# Patient Record
Sex: FEMALE | Race: WHITE | NOT HISPANIC OR LATINO | Employment: FULL TIME | URBAN - METROPOLITAN AREA
[De-identification: names, ages, dates, MRNs, and addresses within clinical notes are randomized per-mention and may not be internally consistent; named-entity substitution may affect disease eponyms.]

---

## 2017-01-12 ENCOUNTER — HOSPITAL ENCOUNTER (OUTPATIENT)
Dept: RADIOLOGY | Facility: HOSPITAL | Age: 52
Discharge: HOME | End: 2017-01-12
Attending: FAMILY MEDICINE
Payer: COMMERCIAL

## 2017-01-12 DIAGNOSIS — J30.9 ALLERGIC RHINITIS, UNSPECIFIED ALLERGIC RHINITIS TRIGGER, UNSPECIFIED RHINITIS SEASONALITY: ICD-10-CM

## 2017-01-12 DIAGNOSIS — R42 DIZZINESS: ICD-10-CM

## 2017-01-12 DIAGNOSIS — R42 VERTIGO: ICD-10-CM

## 2017-01-12 PROCEDURE — 70551 MRI BRAIN STEM W/O DYE: CPT | Mod: TC

## 2017-01-12 PROCEDURE — 70551 MRI BRAIN STEM W/O DYE: CPT | Mod: 26,,, | Performed by: RADIOLOGY

## 2021-04-12 ENCOUNTER — OFFICE VISIT (OUTPATIENT)
Dept: OTOLARYNGOLOGY | Facility: CLINIC | Age: 56
End: 2021-04-12
Payer: COMMERCIAL

## 2021-04-12 ENCOUNTER — CLINICAL SUPPORT (OUTPATIENT)
Dept: AUDIOLOGY | Facility: CLINIC | Age: 56
End: 2021-04-12
Payer: COMMERCIAL

## 2021-04-12 VITALS — WEIGHT: 175.25 LBS | BODY MASS INDEX: 31.05 KG/M2

## 2021-04-12 DIAGNOSIS — H90.3 SENSORINEURAL HEARING LOSS (SNHL) OF BOTH EARS: Primary | ICD-10-CM

## 2021-04-12 DIAGNOSIS — R42 DIZZINESS: ICD-10-CM

## 2021-04-12 DIAGNOSIS — H93.13 TINNITUS OF BOTH EARS: ICD-10-CM

## 2021-04-12 DIAGNOSIS — H90.3 SENSORINEURAL HEARING LOSS (SNHL), BILATERAL: Primary | ICD-10-CM

## 2021-04-12 PROCEDURE — 92567 TYMPANOMETRY: CPT | Mod: S$GLB,,, | Performed by: AUDIOLOGIST

## 2021-04-12 PROCEDURE — 3008F BODY MASS INDEX DOCD: CPT | Mod: CPTII,S$GLB,, | Performed by: OTOLARYNGOLOGY

## 2021-04-12 PROCEDURE — 92567 PR TYMPA2METRY: ICD-10-PCS | Mod: S$GLB,,, | Performed by: AUDIOLOGIST

## 2021-04-12 PROCEDURE — 1126F PR PAIN SEVERITY QUANTIFIED, NO PAIN PRESENT: ICD-10-PCS | Mod: S$GLB,,, | Performed by: OTOLARYNGOLOGY

## 2021-04-12 PROCEDURE — 99203 OFFICE O/P NEW LOW 30 MIN: CPT | Mod: S$GLB,,, | Performed by: OTOLARYNGOLOGY

## 2021-04-12 PROCEDURE — 99203 PR OFFICE/OUTPT VISIT, NEW, LEVL III, 30-44 MIN: ICD-10-PCS | Mod: S$GLB,,, | Performed by: OTOLARYNGOLOGY

## 2021-04-12 PROCEDURE — 99999 PR PBB SHADOW E&M-NEW PATIENT-LVL IV: ICD-10-PCS | Mod: PBBFAC,,, | Performed by: OTOLARYNGOLOGY

## 2021-04-12 PROCEDURE — 3008F PR BODY MASS INDEX (BMI) DOCUMENTED: ICD-10-PCS | Mod: CPTII,S$GLB,, | Performed by: OTOLARYNGOLOGY

## 2021-04-12 PROCEDURE — 1126F AMNT PAIN NOTED NONE PRSNT: CPT | Mod: S$GLB,,, | Performed by: OTOLARYNGOLOGY

## 2021-04-12 PROCEDURE — 92557 PR COMPREHENSIVE HEARING TEST: ICD-10-PCS | Mod: S$GLB,,, | Performed by: AUDIOLOGIST

## 2021-04-12 PROCEDURE — 92557 COMPREHENSIVE HEARING TEST: CPT | Mod: S$GLB,,, | Performed by: AUDIOLOGIST

## 2021-04-12 PROCEDURE — 99999 PR PBB SHADOW E&M-NEW PATIENT-LVL IV: CPT | Mod: PBBFAC,,, | Performed by: OTOLARYNGOLOGY

## 2021-04-15 ENCOUNTER — PATIENT MESSAGE (OUTPATIENT)
Dept: RESEARCH | Facility: HOSPITAL | Age: 56
End: 2021-04-15

## 2021-11-11 ENCOUNTER — CLINICAL SUPPORT (OUTPATIENT)
Dept: AUDIOLOGY | Facility: CLINIC | Age: 56
End: 2021-11-11
Payer: COMMERCIAL

## 2021-11-11 DIAGNOSIS — H90.3 SENSORINEURAL HEARING LOSS, BILATERAL: Primary | ICD-10-CM

## 2021-11-11 PROCEDURE — 99499 UNLISTED E&M SERVICE: CPT | Mod: S$GLB,,, | Performed by: AUDIOLOGIST

## 2021-11-11 PROCEDURE — 99499 NO LOS: ICD-10-PCS | Mod: S$GLB,,, | Performed by: AUDIOLOGIST

## 2021-11-29 ENCOUNTER — CLINICAL SUPPORT (OUTPATIENT)
Dept: AUDIOLOGY | Facility: CLINIC | Age: 56
End: 2021-11-29
Payer: COMMERCIAL

## 2021-11-29 DIAGNOSIS — H90.3 SENSORINEURAL HEARING LOSS, BILATERAL: Primary | ICD-10-CM

## 2021-11-29 PROCEDURE — JEFTAX-P JEFTAX-P: PRESCRIBED RATE 3.5%: ICD-10-PCS | Mod: CSM,,, | Performed by: AUDIOLOGIST

## 2021-11-29 PROCEDURE — V5261 PR HEARING AID, DIGIT, BIN, BTE: ICD-10-PCS | Mod: CSM,S$GLB,, | Performed by: AUDIOLOGIST

## 2021-11-29 PROCEDURE — V5261 HEARING AID, DIGIT, BIN, BTE: HCPCS | Mod: CSM,S$GLB,, | Performed by: AUDIOLOGIST

## 2021-11-29 PROCEDURE — JEFTAX-P JEFTAX-P: PRESCRIBED RATE 3.5%: Mod: CSM,,, | Performed by: AUDIOLOGIST

## 2021-11-29 PROCEDURE — 99499 UNLISTED E&M SERVICE: CPT | Mod: ,,, | Performed by: AUDIOLOGIST

## 2021-11-29 PROCEDURE — 99499 NO LOS: ICD-10-PCS | Mod: ,,, | Performed by: AUDIOLOGIST

## 2021-12-15 ENCOUNTER — CLINICAL SUPPORT (OUTPATIENT)
Dept: AUDIOLOGY | Facility: CLINIC | Age: 56
End: 2021-12-15
Payer: COMMERCIAL

## 2021-12-15 DIAGNOSIS — H90.3 SENSORINEURAL HEARING LOSS, BILATERAL: Primary | ICD-10-CM

## 2021-12-15 PROCEDURE — 99499 NO LOS: ICD-10-PCS | Mod: S$GLB,,, | Performed by: AUDIOLOGIST

## 2021-12-15 PROCEDURE — 99499 UNLISTED E&M SERVICE: CPT | Mod: S$GLB,,, | Performed by: AUDIOLOGIST

## 2022-05-27 ENCOUNTER — PATIENT MESSAGE (OUTPATIENT)
Dept: AUDIOLOGY | Facility: CLINIC | Age: 57
End: 2022-05-27
Payer: COMMERCIAL

## 2022-06-20 ENCOUNTER — DOCUMENTATION ONLY (OUTPATIENT)
Dept: AUDIOLOGY | Facility: CLINIC | Age: 57
End: 2022-06-20
Payer: COMMERCIAL

## 2022-06-20 NOTE — PROGRESS NOTES
Patient had right hearing aid  replaced      [employer] P90-RT   Champagne   Purchase Date: 11/11/2021  Lt SN [id number]   Rt SN [id number]   : M0   Tristine: Small vented  Warranty Exp 2/8/25

## 2022-12-06 ENCOUNTER — OFFICE VISIT (OUTPATIENT)
Dept: URGENT CARE | Facility: CLINIC | Age: 57
End: 2022-12-06
Payer: COMMERCIAL

## 2022-12-06 PROCEDURE — 3044F HG A1C LEVEL LT 7.0%: CPT | Mod: CPTII,S$GLB,, | Performed by: EMERGENCY MEDICINE

## 2022-12-06 PROCEDURE — 3044F PR MOST RECENT HEMOGLOBIN A1C LEVEL <7.0%: ICD-10-PCS | Mod: CPTII,S$GLB,, | Performed by: EMERGENCY MEDICINE

## 2024-06-06 ENCOUNTER — OFFICE VISIT (OUTPATIENT)
Dept: OPTOMETRY | Facility: CLINIC | Age: 59
End: 2024-06-06
Payer: COMMERCIAL

## 2024-06-06 DIAGNOSIS — H25.13 NS (NUCLEAR SCLEROSIS), BILATERAL: ICD-10-CM

## 2024-06-06 DIAGNOSIS — H52.4 PRESBYOPIA: ICD-10-CM

## 2024-06-06 DIAGNOSIS — H02.889 MEIBOMIAN GLAND DISEASE, UNSPECIFIED LATERALITY: Primary | ICD-10-CM

## 2024-06-06 DIAGNOSIS — H04.123 DRY EYE SYNDROME, BILATERAL: ICD-10-CM

## 2024-06-06 PROCEDURE — 1159F MED LIST DOCD IN RCRD: CPT | Mod: CPTII,S$GLB,, | Performed by: OPTOMETRIST

## 2024-06-06 PROCEDURE — 99999 PR PBB SHADOW E&M-EST. PATIENT-LVL IV: CPT | Mod: PBBFAC,,, | Performed by: OPTOMETRIST

## 2024-06-06 PROCEDURE — 1160F RVW MEDS BY RX/DR IN RCRD: CPT | Mod: CPTII,S$GLB,, | Performed by: OPTOMETRIST

## 2024-06-06 PROCEDURE — 92004 COMPRE OPH EXAM NEW PT 1/>: CPT | Mod: S$GLB,,, | Performed by: OPTOMETRIST

## 2024-06-06 RX ORDER — DOXYCYCLINE 100 MG/1
100 CAPSULE ORAL EVERY 12 HOURS
Qty: 60 CAPSULE | Refills: 0 | Status: SHIPPED | OUTPATIENT
Start: 2024-06-06 | End: 2024-07-06

## 2024-06-06 NOTE — PROGRESS NOTES
Dictation #1  MRN:[id number]  CSN:[id number] HPI    Urgent     Pt states that for the last week there has been a bump on RUL and the   vision has been blurry. Pt denies dryness, burning, itching, or tearing.   Pt states that last week the eye was swollen shut, and pain on the lid and   socket. Pt denies any pain today, and complains of pressure and eye   strain.    GTTS:   Warm compresses Qhs   AT's QD  Last edited by Brigette Manzo MA on 6/6/2024  1:06 PM.            Assessment /Plan     For exam results, see Encounter Report.    Meibomian gland disease, unspecified laterality  -Start     doxycycline (VIBRAMYCIN) 100 MG Cap; Take 1 capsule (100 mg total) by mouth every 12 (twelve) hours. X 2 weeks, then daily until finished   Start IVIZIA eyelid scrubs and HYPOCHLOR spray BID OU   Use PF art tears QID OU    NS (nuclear sclerosis), bilateral   MILD, montior    Presbyopia   Current specs OK   Blurred vision OD likely 2/2 dry eye and MGD   Return if no improvement in vision      RTC 1 year

## 2024-07-10 ENCOUNTER — OFFICE VISIT (OUTPATIENT)
Dept: URGENT CARE | Facility: CLINIC | Age: 59
End: 2024-07-10
Payer: COMMERCIAL

## 2024-07-10 ENCOUNTER — HOSPITAL ENCOUNTER (OUTPATIENT)
Dept: CARDIOLOGY | Facility: HOSPITAL | Age: 59
Discharge: HOME | End: 2024-07-10
Payer: COMMERCIAL

## 2024-07-10 VITALS
RESPIRATION RATE: 20 BRPM | BODY MASS INDEX: 29.38 KG/M2 | SYSTOLIC BLOOD PRESSURE: 129 MMHG | DIASTOLIC BLOOD PRESSURE: 78 MMHG | HEART RATE: 88 BPM | HEIGHT: 63 IN | WEIGHT: 165.81 LBS | TEMPERATURE: 98 F

## 2024-07-10 DIAGNOSIS — M62.831 MUSCLE SPASM OF RIGHT CALF: Primary | ICD-10-CM

## 2024-07-10 DIAGNOSIS — M79.661 RIGHT CALF PAIN: ICD-10-CM

## 2024-07-10 PROCEDURE — 93971 EXTREMITY STUDY: CPT | Mod: RT

## 2024-07-10 PROCEDURE — 99213 OFFICE O/P EST LOW 20 MIN: CPT | Mod: S$GLB,,,

## 2024-07-10 PROCEDURE — 93971 EXTREMITY STUDY: CPT | Mod: 26,RT,, | Performed by: INTERNAL MEDICINE

## 2024-07-10 RX ORDER — PRASTERONE 6.5 MG/1
6.5 INSERT VAGINAL
COMMUNITY
Start: 2024-07-01 | End: 2025-07-01

## 2024-07-10 RX ORDER — FEZOLINETANT 45 MG/1
45 TABLET, FILM COATED ORAL
COMMUNITY
Start: 2024-07-01 | End: 2024-09-29

## 2024-07-10 RX ORDER — TIZANIDINE HYDROCHLORIDE 2 MG/1
CAPSULE, GELATIN COATED ORAL
COMMUNITY
Start: 2024-02-29

## 2024-07-10 NOTE — PATIENT INSTRUCTIONS
I have ordered an ultrasound of the right leg to evaluate for any potential blood clot. You should receive a call from [facility] within the next 1-3 days to set up an appointment. If you do not receive a call, you may call our Referral Hotline at [phone] to make an appointment.    Treatment  Gentle stretching should help stop a spasm. Often, you can ease the spasm just by stretching the muscle. Stretching exercises keep your muscles flexible. They also stop them from getting too tight. Do stretches slowly and hold each stretch for 20 to 30 seconds. Try to do the stretches you were shown 2 to 3 times each day.  Ice or heat may help ease your pain. Either one may help stop a spasm, but most people find that heat is more helpful.  Soak the sore area in warm water or using a heating pad can help stop the spasm and lower pain. Heat also helps muscles stretch easier. Do not leave a heating pad on more than 20 minutes at a time. Be sure to check your skin while the heating pad is on to avoid burns. Never go to sleep with a heating pad on.  Putting ice on a muscle that is in spasm can help ease the spasm and reduce pain. Use an ice pack or bag of frozen vegetables wrapped in a towel over the painful part. Never put ice right on the skin. Do not leave the ice on more than 10 to 15 minutes at a time. Do not try to stretch the muscle right after icing.  Massaging the cramping muscle with firm pressure may help ease the spasm.  Drinking extra fluids can help muscle spasms if they are caused by a loss of body fluids. Avoid intense exercise in hot and humid weather to lower the chance of getting muscle spasms.  Sometimes, you may get muscle spasms if you dont get enough of certain nutrients in your diet, like potassium, magnesium, or carbohydrates. If this is the case, changing your diet can help you to avoid muscle cramps. Talk to your doctor about what to eat and drink before and after exercise.  You may want to take  medicine like ibuprofen, naproxen, or acetaminophen to help with pain.    What Can Stop a Muscle Spasm?   Stretching ? Gentle stretching should help stop the spasm. Most often, when a muscle is spasming or shortening in one direction, you stretch the muscle in the opposite direction. Stretching exercises keep your muscles flexible. They also stop them from getting tight.  Start by doing each of these stretches 2 to 3 times. In order for your body to make changes, you will need to hold these stretches for 20 to 30 seconds. Try to do the stretches 2 to 3 times each day. Do all exercises slowly.  Calf stretches standing ? Stand about 12 to 18 inches (30 to 45 cm) away from a wall. Place your hands on the wall at shoulder level. Lean forward. Stretch your left leg straight behind you. Make sure the heel is flat on the floor and the knee straight. Now, bend the knee of the right leg. Be sure that the heel does not come up. Bend your left knee forward until you feel a stretch in the back of the calf of your right leg. This will feel strange, but it is the best way to stretch this calf muscle. Repeat on the other side.           Return to clinic or go to the ED if  Calf pain worsens  You develop redness or swelling of the leg  You are unable to walk due to pain  Pain moves to other areas   Your leg becomes numb  You develop chest pain or difficulty breathing  You have any other concerns or complaints    Should you develop any worsening or new symptoms after leaving urgent care, it is recommended that you go to the ER for further/repeat evaluation.      Follow up with your PCP in 3-5 days after your urgent care visit.     Please remember that you have received care at an urgent care today. Urgent cares are not emergency rooms and are not equipped to handle life threatening emergencies and cannot rule in or out certain medical conditions and you may be released before all of your medical problems are known or treated, please  schedule all follow up appointments as discussed and if you have worsening symptoms please go to the ER to rule out potential life threatening problems, as discussed.

## 2024-07-10 NOTE — PROGRESS NOTES
"Subjective:      Patient ID: Adri Tyler is a 58 y.o. female.    Vitals:  height is 5' 3" (1.6 m) and weight is 75.2 kg (165 lb 12.6 oz). Her oral temperature is 98 °F (36.7 °C). Her blood pressure is 129/78 and her pulse is 88. Her respiration is 20.     Chief Complaint: Other Misc (Yesterday at around noon while sitting, I started with a spasm in my upper right calf muscle and it hasn't went away. It's a constant pain. I have tried pickle juice, drinking lots of water, banana, stretching, hot / cold compress, ibuprofen/ Aleve. - Entered by patient)    Pt states she was sitting yesterday and her leg started to cramp . Pt says she thinks she my have a blood clot because she has tried water, pickle juice and stretching all with no relief.      Provider note starts below:  Patient presents to clinic for evaluation of right calf pain.  States she was sitting at the beauty salon yesterday around noon when she suddenly developed right-sided calf pain.  States the pain feels like a muscle spasm or "Charley horse".  Symptoms were bothersome, but patient was able to continue her visit at the [facility].  States she massaged the calf while in the chair during her visit.  Patient then went home and tried increasing fluids, drinking pickle juice, stretching the calf, size in the calf, and using ice/heat.  No improvement of cramping.  Patient also tried Aleve, ibuprofen, and Biofreeze which did not provide any relief.  Patient states the cramping sensation has been constant since onset.  Denies any mitigating or exacerbating factors.  Patient states she was up all night due to symptoms.  Patient denies any leg swelling, color change, difficulty walking, extremity weakness/numbness, tingling, fever, chills, chest pain, palpitations, shortness for breath, bloody sputum, cough, wound.  Patient denies any recent surgery/hospitalizations or long car rides/plane rides.  No recent medication changes.  No history previous VTE.  No " blood thinners.      Constitution: Negative for appetite change, chills and fever.   Neck: Negative for neck pain and neck stiffness.   Cardiovascular:  Negative for chest pain, leg swelling, palpitations and sob on exertion.   Respiratory:  Negative for chest tightness, cough, sputum production, bloody sputum, shortness of breath and wheezing.    Gastrointestinal:  Negative for nausea and vomiting.   Musculoskeletal:  Positive for muscle cramps (R calf). Negative for trauma, joint pain, joint swelling, abnormal ROM of joint and back pain.   Skin:  Negative for pale, rash, wound, abrasion, laceration, erythema and bruising.   Neurological:  Negative for dizziness, light-headedness, passing out, loss of balance, disorientation, altered mental status, numbness, tingling and tremors.   Psychiatric/Behavioral:  Negative for altered mental status, disorientation and confusion.       Objective:     Physical Exam   Constitutional: She is oriented to person, place, and time.  Non-toxic appearance. She does not appear ill. No distress.   HENT:   Head: Normocephalic and atraumatic.   Eyes: Conjunctivae are normal. Extraocular movement intact   Neck: Neck supple.   Cardiovascular: Normal rate, regular rhythm, normal heart sounds and normal pulses.   Pulmonary/Chest: Effort normal and breath sounds normal. No stridor. No respiratory distress. She has no wheezes. She has no rhonchi. She has no rales. She exhibits no tenderness.   Abdominal: Normal appearance.   Musculoskeletal: Normal range of motion.         General: Normal range of motion.      Comments: Tenderness to palpation to right calf muscle. No bony tenderness. No crepitus over right knee or ankle. No edema of BLE. Normal ROM. No obvious deformities. No varicosities. 2+ DP and PT pulses. Sensation intact. Good capillary refill.    Neurological: She is alert, oriented to person, place, and time and at baseline.   Skin: Skin is warm and dry. No erythema   Psychiatric:  Her behavior is normal. Mood normal.   Nursing note and vitals reviewed.    Assessment:     1. Muscle spasm of right calf    2. Right calf pain        Plan:     Muscle spasm of right calf    Right calf pain  -     CV Ultrasound doppler venous DVT leg right; Future      Medical Decision Making:   Differential Diagnosis:   Muscle spasm of calf  DVT  Rhabdo    Urgent Care Management:  Offered toradol injection in clinic, pt declined.  Offered Rx for muscle relaxer, pt declined.  States her pain is not intolerable, just had her concerned.  Ordered US to r/o DVT.  Strict ED precautions discussed.     Additional MDM:     Well's Criteria Score:  -Clinical symptoms of DVT (leg swelling, pain with palpation) = 3.0  -PE is #1 diagnosis OR equally likely =            0.0  -Heart Rate >100 =   0.0  -Immobilization (= or > than 3 days) or surgery in the previous 4 weeks = 0.0  -Previous DVT/PE = 0.0  -Hemoptysis =          0.0  -Malignancy =           0.0  Well's Probability Score =    3        Patient Instructions   I have ordered an ultrasound of the right leg to evaluate for any potential blood clot. You should receive a call from [facility] within the next 1-3 days to set up an appointment. If you do not receive a call, you may call our Referral Hotline at [phone] to make an appointment.    Treatment  Gentle stretching should help stop a spasm. Often, you can ease the spasm just by stretching the muscle. Stretching exercises keep your muscles flexible. They also stop them from getting too tight. Do stretches slowly and hold each stretch for 20 to 30 seconds. Try to do the stretches you were shown 2 to 3 times each day.  Ice or heat may help ease your pain. Either one may help stop a spasm, but most people find that heat is more helpful.  Soak the sore area in warm water or using a heating pad can help stop the spasm and lower pain. Heat also helps muscles stretch easier. Do not leave a heating pad on more than 20 minutes  at a time. Be sure to check your skin while the heating pad is on to avoid burns. Never go to sleep with a heating pad on.  Putting ice on a muscle that is in spasm can help ease the spasm and reduce pain. Use an ice pack or bag of frozen vegetables wrapped in a towel over the painful part. Never put ice right on the skin. Do not leave the ice on more than 10 to 15 minutes at a time. Do not try to stretch the muscle right after icing.  Massaging the cramping muscle with firm pressure may help ease the spasm.  Drinking extra fluids can help muscle spasms if they are caused by a loss of body fluids. Avoid intense exercise in hot and humid weather to lower the chance of getting muscle spasms.  Sometimes, you may get muscle spasms if you dont get enough of certain nutrients in your diet, like potassium, magnesium, or carbohydrates. If this is the case, changing your diet can help you to avoid muscle cramps. Talk to your doctor about what to eat and drink before and after exercise.  You may want to take medicine like ibuprofen, naproxen, or acetaminophen to help with pain.    What Can Stop a Muscle Spasm?   Stretching ? Gentle stretching should help stop the spasm. Most often, when a muscle is spasming or shortening in one direction, you stretch the muscle in the opposite direction. Stretching exercises keep your muscles flexible. They also stop them from getting tight.  Start by doing each of these stretches 2 to 3 times. In order for your body to make changes, you will need to hold these stretches for 20 to 30 seconds. Try to do the stretches 2 to 3 times each day. Do all exercises slowly.  Calf stretches standing ? Stand about 12 to 18 inches (30 to 45 cm) away from a wall. Place your hands on the wall at shoulder level. Lean forward. Stretch your left leg straight behind you. Make sure the heel is flat on the floor and the knee straight. Now, bend the knee of the right leg. Be sure that the heel does not come up.  Bend your left knee forward until you feel a stretch in the back of the calf of your right leg. This will feel strange, but it is the best way to stretch this calf muscle. Repeat on the other side.           Return to clinic or go to the ED if  Calf pain worsens  You develop redness or swelling of the leg  You are unable to walk due to pain  Pain moves to other areas   Your leg becomes numb  You develop chest pain or difficulty breathing  You have any other concerns or complaints    Should you develop any worsening or new symptoms after leaving urgent care, it is recommended that you go to the ER for further/repeat evaluation.      Follow up with your PCP in 3-5 days after your urgent care visit.     Please remember that you have received care at an urgent care today. Urgent cares are not emergency rooms and are not equipped to handle life threatening emergencies and cannot rule in or out certain medical conditions and you may be released before all of your medical problems are known or treated, please schedule all follow up appointments as discussed and if you have worsening symptoms please go to the ER to rule out potential life threatening problems, as discussed.

## 2025-04-28 NOTE — PROGRESS NOTES
Subjective:       dAri Tyler is a 59 y.o. female who is a new patient who was referred by Dr. Melchor Mantilla  for evaluation of hematuria.      Referred for microhematuria. No recent urine testing available. Reported trace blood on POCT UA in PCP's note 4/24/25. No recent UA micro in chart. No gross hematuria.     She reports R flank pain x 2-3 weeks. Intermittent. She reports h/o nephrolithiasis - passed spontaneously in 12/2024. She also reports going to  maybe a year ago with nephrolithiasis - CT did not show stone. She was treated for UTI at that time.    No family history h/o stones or  malignancy. Nonsmoker.       The following portions of the patient's history were reviewed and updated as appropriate: allergies, current medications, past family history, past medical history, past social history, past surgical history and problem list.    Review of Systems  12 point review of systems completed. Pertinent positive and negatives listed in HPI        Objective:    Vitals: LMP 12/28/2016 (LMP Unknown)     Physical Exam   General: well developed, well nourished in no acute distress  Head: normocephalic, atraumatic  Neck: no obvious enlargement of thyroid  HEENT: EOMI, mucus membranes moist, sclera anicteric, no hearing impairment  Lungs: symmetric expansion, non-labored breathing  Neuro: alert and oriented x 3, no gross deficits  Psych: normal judgment and insight, normal mood/affect and non-anxious  Genitourinary:   deferred      Lab Review   Urine analysis today in clinic shows - 5-10 RBCs    Lab Results   Component Value Date    WBC 5.7 11/10/2023    WBC 6.6 11/14/2019    HGB 13.0 11/10/2023    HGB 14.0 11/14/2019    HCT 41.0 11/10/2023    HCT 43.5 11/14/2019    MCV 95 11/10/2023    MCV 90 11/14/2019     11/10/2023     11/14/2019     Lab Results   Component Value Date    CREATININE 0.87 11/10/2023    BUN 14 11/10/2023         Imaging  Images and reports were personally reviewed by me and  discussed with patient  CT report 2021       Assessment/Plan:      1. Microhematuria    - Discussed etiology and workup of hematuria   - UCx - UA otherwise clear today   - Cytology - not indicated   - CT urogram   - Office cystoscopy - will hold for now.   - Will get UA micro now. Discussed possible cystoscopy.      2. Flank pain    - Suspected h/o stones   - CT uro now (suspected stones and microhematuria)         Follow up in 2 weeks

## 2025-04-29 ENCOUNTER — OFFICE VISIT (OUTPATIENT)
Dept: UROLOGY | Facility: CLINIC | Age: 60
End: 2025-04-29
Payer: COMMERCIAL

## 2025-04-29 DIAGNOSIS — R31.29 MICROHEMATURIA: Primary | ICD-10-CM

## 2025-04-29 DIAGNOSIS — R10.9 FLANK PAIN: ICD-10-CM

## 2025-04-29 LAB
BACTERIA #/AREA URNS AUTO: ABNORMAL /HPF
MICROSCOPIC COMMENT: ABNORMAL
RBC #/AREA URNS AUTO: 2 /HPF (ref 0–4)
SQUAMOUS #/AREA URNS AUTO: 3 /HPF
WBC #/AREA URNS AUTO: 1 /HPF (ref 0–5)

## 2025-04-29 PROCEDURE — 99204 OFFICE O/P NEW MOD 45 MIN: CPT | Mod: S$GLB,,, | Performed by: UROLOGY

## 2025-04-29 PROCEDURE — 1160F RVW MEDS BY RX/DR IN RCRD: CPT | Mod: CPTII,S$GLB,, | Performed by: UROLOGY

## 2025-04-29 PROCEDURE — 81001 URINALYSIS AUTO W/SCOPE: CPT | Performed by: UROLOGY

## 2025-04-29 PROCEDURE — 1159F MED LIST DOCD IN RCRD: CPT | Mod: CPTII,S$GLB,, | Performed by: UROLOGY

## 2025-04-29 PROCEDURE — 99999 PR PBB SHADOW E&M-EST. PATIENT-LVL IV: CPT | Mod: PBBFAC,,, | Performed by: UROLOGY

## 2025-04-30 ENCOUNTER — RESULTS FOLLOW-UP (OUTPATIENT)
Dept: UROLOGY | Facility: CLINIC | Age: 60
End: 2025-04-30

## 2025-04-30 ENCOUNTER — TELEPHONE (OUTPATIENT)
Dept: UROLOGY | Facility: CLINIC | Age: 60
End: 2025-04-30
Payer: COMMERCIAL

## 2025-04-30 DIAGNOSIS — R10.9 FLANK PAIN: ICD-10-CM

## 2025-04-30 DIAGNOSIS — R31.29 MICROHEMATURIA: Primary | ICD-10-CM

## 2025-04-30 NOTE — TELEPHONE ENCOUNTER
----- Message from Med Assistant Davis sent at 4/30/2025  3:31 PM CDT -----  Regarding: FW: Labs for CT    ----- Message -----  From: Agustina Nguyen  Sent: 4/30/2025   3:22 PM CDT  To: [facility] Staff  Subject: Labs for CT                                       Good day,    Please have provider add a Creatinine lab to epic for the CT , thank you

## 2025-05-01 ENCOUNTER — TELEPHONE (OUTPATIENT)
Dept: UROLOGY | Facility: CLINIC | Age: 60
End: 2025-05-01
Payer: COMMERCIAL

## 2025-05-01 DIAGNOSIS — R31.29 MICROHEMATURIA: Primary | ICD-10-CM

## 2025-05-01 DIAGNOSIS — R10.9 FLANK PAIN: ICD-10-CM

## 2025-05-01 NOTE — TELEPHONE ENCOUNTER
----- Message from Med Assistant Davis sent at 4/30/2025 12:15 PM CDT -----  Regarding: FW: BCBS  Pt need external orders.  ----- Message -----  From: Ned Norman  Sent: 4/30/2025  10:29 AM CDT  To: [facility] Staff  Subject: BCBS                                             Type: Patient Call BackWho called:BC[employer]-Maira is the request in detail:Patient requesting MRI orders to be faxed over to [facility] service [address] [place] [address] phone #[phone]/Fax #[phone] the clinic reply by [facility]? NoWould the patient rather a call back or a response via [facility]? Call Mario call back number:[phone]-patientAdditional Information:Thank you.

## 2025-05-07 ENCOUNTER — TELEPHONE (OUTPATIENT)
Dept: UROLOGY | Facility: CLINIC | Age: 60
End: 2025-05-07
Payer: COMMERCIAL

## 2025-05-07 NOTE — TELEPHONE ENCOUNTER
Spoke to pt. Orders faxed over again.   ----- Message from Eric sent at 5/7/2025  8:44 AM CDT -----  Regarding: Self  Type: Patient Call Back What is the request in detail: pt is at diagnostic imaging and needs her orders sent today , called staff no answer Can the clinic reply by [facility]? No Would the patient rather a call back or a response via [facility]? Call [facility] call back number: .[phone] Information:Thank you.

## 2025-05-10 NOTE — PROGRESS NOTES
"  Subjective:       Adri Tyler is a 59 y.o. female who is a new patient who was referred by Dr. Melchor Mantilla  for evaluation of hematuria.      Referred for microhematuria. No recent urine testing available. Reported trace blood on POCT UA in PCP's note 4/24/25. No recent UA micro in chart. No gross hematuria.     She reports R flank pain x 2-3 weeks. Intermittent. She reports h/o nephrolithiasis - passed spontaneously in 12/2024. She also reports going to  maybe a year ago with nephrolithiasis - CT did not show stone. She was treated for UTI at that time.    No family history h/o stones or  malignancy. Nonsmoker.     UA micro - 2 RBCs (+bacteria)    CT uro (DIS) 5/25 - no stones/hydro/mass. Fat stranding of colon near splenic flexure - possible mild diverticulitis. Diffuse BW thickening (underdistended). Delayed imaging did not opacify R mid/distal ureter well. No stones or filling defects identified.       Now with dysuria x 3 days. Continues to have flank pain. She is understandably frustrated by lack of answers to explain her R flank pain based on the CT scan.       The following portions of the patient's history were reviewed and updated as appropriate: allergies, current medications, past family history, past medical history, past social history, past surgical history and problem list.    Review of Systems  12 point review of systems completed. Pertinent positive and negatives listed in HPI        Objective:    Vitals: Ht 5' 3" (1.6 m)   Wt 79.7 kg (175 lb 10.6 oz)   LMP 12/28/2016 (LMP Unknown)   BMI 31.12 kg/m²     Physical Exam   General: well developed, well nourished in no acute distress  Head: normocephalic, atraumatic  Neck: no obvious enlargement of thyroid  HEENT: EOMI, mucus membranes moist, sclera anicteric, no hearing impairment  Lungs: symmetric expansion, non-labored breathing  Neuro: alert and oriented x 3, no gross deficits  Psych: normal judgment and insight, normal mood/affect " and non-anxious  Genitourinary: deferred      Lab Review   Urine analysis today in clinic shows - 5-10 RBCs, small ketones    Lab Results   Component Value Date    WBC 5.7 11/10/2023    WBC 6.6 11/14/2019    HGB 13.0 11/10/2023    HGB 14.0 11/14/2019    HCT 41.0 11/10/2023    HCT 43.5 11/14/2019    MCV 95 11/10/2023    MCV 90 11/14/2019     11/10/2023     11/14/2019     Lab Results   Component Value Date    CREATININE 0.87 11/10/2023    BUN 14 11/10/2023         Imaging  Images and reports were personally reviewed by me and discussed with patient  CT report 2021, CT 2025       Assessment/Plan:      1. Microhematuria    - Discussed etiology and workup of hematuria   - UCx - UA otherwise clear   - Cytology - not indicated   - CT urogram - no stone, possible diverticulitis   - Office cystoscopy - discussed - declined   - UA micro without clinically significant hematuria (2 RBCs)     2. R flank pain    - Suspected h/o stones - no stones on recent CT scan (DIS)   - CT uro - no stones or other  pathology. Possible mild/subacute diverticulitis in L colon.    - Offered cystoscopy to full evaluate bladder - declines.     3. Dysuria   - UCx today    Discussed finding of possible mild/subacute diverticulitis. Recommend f/u with PCP. She states long h/o known diverticulosis.       Follow up PRN

## 2025-05-13 ENCOUNTER — OFFICE VISIT (OUTPATIENT)
Dept: UROLOGY | Facility: CLINIC | Age: 60
End: 2025-05-13
Payer: COMMERCIAL

## 2025-05-13 VITALS — WEIGHT: 175.69 LBS | HEIGHT: 63 IN | BODY MASS INDEX: 31.13 KG/M2

## 2025-05-13 DIAGNOSIS — R10.9 FLANK PAIN: ICD-10-CM

## 2025-05-13 DIAGNOSIS — R30.0 DYSURIA: ICD-10-CM

## 2025-05-13 DIAGNOSIS — R31.29 MICROHEMATURIA: Primary | ICD-10-CM

## 2025-05-13 PROCEDURE — 87186 SC STD MICRODIL/AGAR DIL: CPT | Performed by: UROLOGY

## 2025-05-13 PROCEDURE — 1159F MED LIST DOCD IN RCRD: CPT | Mod: CPTII,S$GLB,, | Performed by: UROLOGY

## 2025-05-13 PROCEDURE — 99999 PR PBB SHADOW E&M-EST. PATIENT-LVL III: CPT | Mod: PBBFAC,,, | Performed by: UROLOGY

## 2025-05-13 PROCEDURE — 1160F RVW MEDS BY RX/DR IN RCRD: CPT | Mod: CPTII,S$GLB,, | Performed by: UROLOGY

## 2025-05-13 PROCEDURE — 3008F BODY MASS INDEX DOCD: CPT | Mod: CPTII,S$GLB,, | Performed by: UROLOGY

## 2025-05-13 PROCEDURE — 99214 OFFICE O/P EST MOD 30 MIN: CPT | Mod: S$GLB,,, | Performed by: UROLOGY

## 2025-05-15 ENCOUNTER — TELEPHONE (OUTPATIENT)
Dept: UROLOGY | Facility: CLINIC | Age: 60
End: 2025-05-15
Payer: COMMERCIAL

## 2025-05-15 ENCOUNTER — RESULTS FOLLOW-UP (OUTPATIENT)
Dept: UROLOGY | Facility: CLINIC | Age: 60
End: 2025-05-15

## 2025-05-15 LAB — BACTERIA UR CULT: ABNORMAL

## 2025-05-15 RX ORDER — CEPHALEXIN 500 MG/1
500 CAPSULE ORAL EVERY 8 HOURS
Qty: 21 CAPSULE | Refills: 0 | Status: SHIPPED | OUTPATIENT
Start: 2025-05-15 | End: 2025-05-22

## 2025-05-15 NOTE — TELEPHONE ENCOUNTER
Attempted to contact pt. Lvm.  ----- Message from Inocencia Duenas MD sent at 5/15/2025  9:55 AM CDT -----  Please inform patient of urinary tract infection on recent urine culture. Appropriate antibiotics (Keflex) were sent in to the pharmacy.    ----- Message -----  From: Lab, Background User  Sent: 5/14/2025   7:26 AM CDT  To: Inocencia Duenas MD

## 2025-05-15 NOTE — TELEPHONE ENCOUNTER
Pt was contacted, informed her of results.  ----- Message from Yves Luis sent at 5/15/2025  3:29 PM CDT -----    ----- Message -----  From: Mari Martinez MA  Sent: 5/15/2025   3:16 PM CDT  To: [facility] Staff    Type:  Patient Returning CallWho Called:Pt Who Left Message for Patient:Ryan Velasco MADoes the patient know what this is regarding?:Would the patient rather a call back or a response via [facility]?  Call backCallback Best Call Back Number:Telephone Information:Mobile          [phone] Additional Information:Thank you.